# Patient Record
Sex: FEMALE | Race: WHITE
[De-identification: names, ages, dates, MRNs, and addresses within clinical notes are randomized per-mention and may not be internally consistent; named-entity substitution may affect disease eponyms.]

---

## 2021-01-16 ENCOUNTER — HOSPITAL ENCOUNTER (OUTPATIENT)
Dept: HOSPITAL 62 - RAD | Age: 26
End: 2021-01-16
Attending: FAMILY MEDICINE
Payer: OTHER GOVERNMENT

## 2021-01-16 DIAGNOSIS — R94.02: Primary | ICD-10-CM

## 2021-01-16 PROCEDURE — 70551 MRI BRAIN STEM W/O DYE: CPT

## 2021-01-16 NOTE — RADIOLOGY REPORT (SQ)
EXAM DESCRIPTION:  MRI HEAD WITHOUT



IMAGES COMPLETED DATE/TIME:  1/16/2021 8:15 am



REASON FOR STUDY:  ABD BRAIN SCAN R94.02  ABNORMAL BRAIN SCAN



COMPARISON:  None.



TECHNIQUE:  Multiplanar imaging includes non-contrasted T1, T2, FLAIR, and diffusion with ADC map seq
uences. Images stored on PACS.



LIMITATIONS:  Artifact from left hearing obscures portions of the left temple region and posterior cr
anial fossa.  Mild motion artifact on T1 weighted images.



FINDINGS:  ANATOMY: No anomalies. Normal vascular flow voids. Pituitary fossa normal.

CSF SPACES: Normal in size and contour. No hemorrhage.

CEREBRUM: Sulci and gyri normal in size and contour. Normal white matter signal on FLAIR imaging.  No
 evidence of hemorrhage, mass, or extraaxial fluid collection.

POSTERIOR FOSSA: No signal alteration. No hemorrhage. No edema, masses or mass effect.  Internal [place]
tory canals, cerebello-pontine angles, and visualized mastoid air cells are normal.

DIFFUSION IMAGING: Negative for acute or sub-acute infarction.

ORBITS: No masses. Globes normal.

PARANASAL  SINUSES: No fluid levels.  Mucosa normal.

OTHER: No other significant finding.



IMPRESSION:  Artifact as above.  No acute abnormality on noncontrast MRI of the brain.

EVIDENCE OF ACUTE STROKE: NO.



TECHNICAL DOCUMENTATION:  JOB ID:  [id number]

 2011 [employer]- All Rights Reserved



Reading location - IP/workstation name: [email or web address]